# Patient Record
Sex: FEMALE | Race: WHITE | NOT HISPANIC OR LATINO | Employment: OTHER | URBAN - METROPOLITAN AREA
[De-identification: names, ages, dates, MRNs, and addresses within clinical notes are randomized per-mention and may not be internally consistent; named-entity substitution may affect disease eponyms.]

---

## 2022-08-18 PROBLEM — F06.2 PSYCHOTIC DISORDER DUE TO ANOTHER MEDICAL CONDITION WITH DELUSIONS: Status: ACTIVE | Noted: 2022-08-18

## 2022-08-18 PROBLEM — S06.9XAA TBI (TRAUMATIC BRAIN INJURY): Status: ACTIVE | Noted: 2022-08-18

## 2022-08-18 PROBLEM — S06.9XAA TBI (TRAUMATIC BRAIN INJURY): Status: RESOLVED | Noted: 2022-08-18 | Resolved: 2022-08-18

## 2022-08-31 ENCOUNTER — PATIENT OUTREACH (OUTPATIENT)
Dept: ADMINISTRATIVE | Facility: CLINIC | Age: 62
End: 2022-08-31
Payer: MEDICARE

## 2022-08-31 NOTE — PROGRESS NOTES
C3 nurse attempted to contact Criss Seth  for a TCC post hospital discharge follow up call. No answer. No voicemail available.The patient has an appointment with the ACT team for a home visit on 9/1/22 @ [phone].         Admitted

## 2022-09-01 NOTE — TELEPHONE ENCOUNTER
2nd attempt made to reach patient for TCC call. No answer on no voicemail, x both numbers listed.

## 2022-09-02 NOTE — TELEPHONE ENCOUNTER
3rd attempt made to reach patient for TCC call. No answer and no voicemail x both numbers listed.

## 2023-04-05 DIAGNOSIS — I10 ESSENTIAL HYPERTENSION: Primary | ICD-10-CM

## 2023-04-05 RX ORDER — PROPRANOLOL HYDROCHLORIDE 60 MG/1
60 CAPSULE, EXTENDED RELEASE ORAL DAILY
Qty: 30 CAPSULE | Refills: 1 | Status: SHIPPED | OUTPATIENT
Start: 2023-04-05 | End: 2023-06-05 | Stop reason: SDUPTHER

## 2023-04-05 NOTE — TELEPHONE ENCOUNTER
----- Message from Akua Quintanilla sent at 4/5/2023 10:41 AM CDT -----  Contact: [phone] Patient  Pt is asking if Dr Contreras can please fill her bp meds until she sees Dr Contreras on 05/02/2023? Pt stated strokes and heart issues run in her family. Pt is out of her medication    Requesting an RX refill or new RX.  Is this a refill or new RX: new  RX name and strength (copy/paste from chart):  propranoloL (INDERAL LA) 60 MG 24 hr capsule  Is this a 30 day or 90 day RX:   Pharmacy name and phone # (copy/paste from chart):    [facility] - [place] - [id number]  [address]  [place] [address]  Phone: [phone] Fax: [phone]

## 2023-05-18 ENCOUNTER — TELEPHONE (OUTPATIENT)
Dept: PRIMARY CARE CLINIC | Facility: CLINIC | Age: 63
End: 2023-05-18
Payer: MEDICARE

## 2023-05-18 NOTE — TELEPHONE ENCOUNTER
----- Message from Becky Rogers sent at 5/18/2023  3:11 PM CDT -----  Regarding: pt called  Name of Who is Calling:  Pt called        What is the request in detail:  The pt needs to be rescheduled she was sick the day of her original appt and will be out of medication before the next available appt 06/19 . Please advise         Can the clinic reply by [facility]: no          What [facility] to [facility] Back [facility] [phone] (home)

## 2023-06-05 DIAGNOSIS — I10 ESSENTIAL HYPERTENSION: ICD-10-CM

## 2023-06-05 RX ORDER — PROPRANOLOL HYDROCHLORIDE 60 MG/1
60 CAPSULE, EXTENDED RELEASE ORAL DAILY
Qty: 30 CAPSULE | Refills: 1 | Status: SHIPPED | OUTPATIENT
Start: 2023-06-05 | End: 2023-07-24
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
## 2023-06-20 ENCOUNTER — OFFICE VISIT (OUTPATIENT)
Dept: PRIMARY CARE CLINIC | Facility: CLINIC | Age: 63
End: 2023-06-20
Payer: MEDICARE

## 2023-06-20 VITALS
HEIGHT: 64 IN | OXYGEN SATURATION: 98 % | TEMPERATURE: 98 F | HEART RATE: 82 BPM | WEIGHT: 124.75 LBS | RESPIRATION RATE: 18 BRPM | BODY MASS INDEX: 21.3 KG/M2 | SYSTOLIC BLOOD PRESSURE: 128 MMHG | DIASTOLIC BLOOD PRESSURE: 82 MMHG

## 2023-06-20 DIAGNOSIS — M81.0 OSTEOPOROSIS, UNSPECIFIED OSTEOPOROSIS TYPE, UNSPECIFIED PATHOLOGICAL FRACTURE PRESENCE: ICD-10-CM

## 2023-06-20 DIAGNOSIS — M54.41 CHRONIC LOW BACK PAIN WITH BILATERAL SCIATICA, UNSPECIFIED BACK PAIN LATERALITY: ICD-10-CM

## 2023-06-20 DIAGNOSIS — M54.42 CHRONIC LOW BACK PAIN WITH BILATERAL SCIATICA, UNSPECIFIED BACK PAIN LATERALITY: ICD-10-CM

## 2023-06-20 DIAGNOSIS — R73.01 IFG (IMPAIRED FASTING GLUCOSE): ICD-10-CM

## 2023-06-20 DIAGNOSIS — G89.29 CHRONIC LOW BACK PAIN WITH BILATERAL SCIATICA, UNSPECIFIED BACK PAIN LATERALITY: ICD-10-CM

## 2023-06-20 DIAGNOSIS — Z12.31 ENCOUNTER FOR SCREENING MAMMOGRAM FOR MALIGNANT NEOPLASM OF BREAST: ICD-10-CM

## 2023-06-20 DIAGNOSIS — F33.9 EPISODE OF RECURRENT MAJOR DEPRESSIVE DISORDER, UNSPECIFIED DEPRESSION EPISODE SEVERITY: ICD-10-CM

## 2023-06-20 DIAGNOSIS — G40.909 SEIZURE DISORDER: ICD-10-CM

## 2023-06-20 DIAGNOSIS — I10 ESSENTIAL HYPERTENSION: Primary | ICD-10-CM

## 2023-06-20 PROCEDURE — 3288F FALL RISK ASSESSMENT DOCD: CPT | Mod: CPTII,S$GLB,, | Performed by: INTERNAL MEDICINE

## 2023-06-20 PROCEDURE — 1101F PT FALLS ASSESS-DOCD LE1/YR: CPT | Mod: CPTII,S$GLB,, | Performed by: INTERNAL MEDICINE

## 2023-06-20 PROCEDURE — 99999 PR PBB SHADOW E&M-EST. PATIENT-LVL V: CPT | Mod: PBBFAC,,, | Performed by: INTERNAL MEDICINE

## 2023-06-20 PROCEDURE — 3079F PR MOST RECENT DIASTOLIC BLOOD PRESSURE 80-89 MM HG: ICD-10-PCS | Mod: CPTII,S$GLB,, | Performed by: INTERNAL MEDICINE

## 2023-06-20 PROCEDURE — 1159F MED LIST DOCD IN RCRD: CPT | Mod: CPTII,S$GLB,, | Performed by: INTERNAL MEDICINE

## 2023-06-20 PROCEDURE — 3074F PR MOST RECENT SYSTOLIC BLOOD PRESSURE < 130 MM HG: ICD-10-PCS | Mod: CPTII,S$GLB,, | Performed by: INTERNAL MEDICINE

## 2023-06-20 PROCEDURE — 3008F PR BODY MASS INDEX (BMI) DOCUMENTED: ICD-10-PCS | Mod: CPTII,S$GLB,, | Performed by: INTERNAL MEDICINE

## 2023-06-20 PROCEDURE — 3079F DIAST BP 80-89 MM HG: CPT | Mod: CPTII,S$GLB,, | Performed by: INTERNAL MEDICINE

## 2023-06-20 PROCEDURE — 1101F PR PT FALLS ASSESS DOC 0-1 FALLS W/OUT INJ PAST YR: ICD-10-PCS | Mod: CPTII,S$GLB,, | Performed by: INTERNAL MEDICINE

## 2023-06-20 PROCEDURE — 99204 OFFICE O/P NEW MOD 45 MIN: CPT | Mod: S$GLB,,, | Performed by: INTERNAL MEDICINE

## 2023-06-20 PROCEDURE — 99204 PR OFFICE/OUTPT VISIT, NEW, LEVL IV, 45-59 MIN: ICD-10-PCS | Mod: S$GLB,,, | Performed by: INTERNAL MEDICINE

## 2023-06-20 PROCEDURE — 3288F PR FALLS RISK ASSESSMENT DOCUMENTED: ICD-10-PCS | Mod: CPTII,S$GLB,, | Performed by: INTERNAL MEDICINE

## 2023-06-20 PROCEDURE — 3008F BODY MASS INDEX DOCD: CPT | Mod: CPTII,S$GLB,, | Performed by: INTERNAL MEDICINE

## 2023-06-20 PROCEDURE — 3074F SYST BP LT 130 MM HG: CPT | Mod: CPTII,S$GLB,, | Performed by: INTERNAL MEDICINE

## 2023-06-20 PROCEDURE — 1159F PR MEDICATION LIST DOCUMENTED IN MEDICAL RECORD: ICD-10-PCS | Mod: CPTII,S$GLB,, | Performed by: INTERNAL MEDICINE

## 2023-06-20 PROCEDURE — 99999 PR PBB SHADOW E&M-EST. PATIENT-LVL V: ICD-10-PCS | Mod: PBBFAC,,, | Performed by: INTERNAL MEDICINE

## 2023-06-20 RX ORDER — TRAZODONE HYDROCHLORIDE 50 MG/1
50 TABLET ORAL NIGHTLY
Qty: 30 TABLET | Refills: 5 | Status: SHIPPED | OUTPATIENT
Start: 2023-06-20 | End: 2023-12-05

## 2023-06-20 RX ORDER — PROMETHAZINE HYDROCHLORIDE 25 MG/1
25 TABLET ORAL DAILY
COMMUNITY
Start: 2023-05-22

## 2023-07-10 ENCOUNTER — TELEPHONE (OUTPATIENT)
Dept: PRIMARY CARE CLINIC | Facility: CLINIC | Age: 63
End: 2023-07-10
Payer: MEDICARE

## 2023-07-10 NOTE — TELEPHONE ENCOUNTER
Pt says the trazodone is not working and she is having panic attacks really bad lately, wants to try another medication for sleeping and wants something that will help will the panic attacks.

## 2023-07-10 NOTE — TELEPHONE ENCOUNTER
----- Message from Ivette Archer sent at 7/10/2023  9:45 AM CDT -----  Contact: self [phone]  Pt requesting a call in regards to sleep meds.    Please call and advise

## 2023-07-11 ENCOUNTER — TELEPHONE (OUTPATIENT)
Dept: PRIMARY CARE CLINIC | Facility: CLINIC | Age: 63
End: 2023-07-11
Payer: MEDICARE

## 2023-07-12 RX ORDER — HYDROXYZINE HYDROCHLORIDE 50 MG/1
50 TABLET, FILM COATED ORAL NIGHTLY PRN
Qty: 30 TABLET | Refills: 2 | Status: SHIPPED | OUTPATIENT
Start: 2023-07-12

## 2023-07-12 NOTE — TELEPHONE ENCOUNTER
WESLEY Broderick I sent hydroxyzine to the pharmacy.  This is a relatively safe, not habit forming medicaiton that can help with sleep and help with anxiety.  It would be safe to take with the pain medication she is being prescribed.

## 2023-07-13 ENCOUNTER — TELEPHONE (OUTPATIENT)
Dept: PRIMARY CARE CLINIC | Facility: CLINIC | Age: 63
End: 2023-07-13
Payer: MEDICARE

## 2023-07-14 NOTE — TELEPHONE ENCOUNTER
I did send in hydroyxine back on 7/11 for the sleep:    This is a relatively safe, not habit forming medicaiton that can help with sleep and help with anxiety.  It would be safe to take with the pain medication she is being prescribed.    Had she already tried that or did she not know it had been called in?

## 2023-07-14 NOTE — TELEPHONE ENCOUNTER
Spoke with pt  States that she does not take any pain medication  She has tried the medication that was prescribed but its not working.

## 2023-07-17 ENCOUNTER — PATIENT MESSAGE (OUTPATIENT)
Dept: PRIMARY CARE CLINIC | Facility: CLINIC | Age: 63
End: 2023-07-17
Payer: MEDICARE

## 2023-07-19 ENCOUNTER — PATIENT MESSAGE (OUTPATIENT)
Dept: PRIMARY CARE CLINIC | Facility: CLINIC | Age: 63
End: 2023-07-19
Payer: MEDICARE
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
## 2023-07-19 NOTE — PROGRESS NOTES
[facility] Progress Note  9/21/2023    This was a virtual visit conducted via webcam.      Reason for Visit:      is having trouble with insomnia  Time spent on visit today: 15 minutes    History of Present Illness:     Insomnia  In early 20s was on ambien for sleep she says- worked well, but had some sleepwalking episodes.  Recently has been having trouble with insomnia again.  We tried trazodone initially - 1 (50 mg) wasn't working, then tried 2 (100 mg)- it wasn't helping.  Then tried hydroxyzine (50 mg)- she felt calm but couldn't sleep all night - felt like it made the insomnia worse.  She wants to try something else.  We had been reluctant to use sedative hypnotics because PDMP showed opiate rx, but she explains those were for dental work and were temporary - she is no longer on them - pdmp shows last fill on 6/15 which is consistent with her explanation.    We discussed possible side effects of the sedative hypnotics, including dependence.  Encouraged to use as sparingly as possible.    Also, patient is scheduled today for a CT scan at [facility].  She says this was initially ordered by her orthopedist, Dr. Otis Godoy, Fax [phone].  She is unsure what it is a CT scan of.  She says that it was related to calcifications in her arteries.  I can only see diagnoses from her visits which mention Abdominal aortic aneurysm, so i'm wondering if that was possibly seen on imaging of her back and his is follow up?  WE are going to request the reports/records to clarify.      Medications:       Current Outpatient Medications:     cyanocobalamin 500 mcg tablet, Take 2 tablets (1,000 mcg total) by mouth once daily., Disp: 60 tablet, Rfl: 1    diphenoxylate-atropine 2.5-0.025 mg (LOMOTIL) 2.5-0.025 mg per tablet, Take 1 tablet by mouth 4 (four) times daily as needed for Diarrhea., Disp: , Rfl:     hydrOXYzine (ATARAX) 50 MG tablet, Take 1 tablet (50 mg total) by mouth nightly as needed for Anxiety  (for sleep/anxiety)., Disp: 30 tablet, Rfl: 2    lamoTRIgine (LAMICTAL) 25 MG tablet, Take 2 tablets (50 mg total) by mouth once daily., Disp: 30 tablet, Rfl: 1    promethazine (PHENERGAN) 25 MG tablet, Take 25 mg by mouth once daily., Disp: , Rfl:     traZODone (DESYREL) 50 MG tablet, Take 1 tablet (50 mg total) by mouth every evening., Disp: 30 tablet, Rfl: 5    propranoloL (INDERAL LA) 60 MG 24 hr capsule, TAKE ONE CAPSULE BY MOUTH EVERY DAY, Disp: 30 capsule, Rfl: 0    zolpidem (AMBIEN) 5 MG Tab, Take 1 tablet (5 mg total) by mouth nightly as needed., Disp: 30 tablet, Rfl: 2      Review of Systems:     Review of Systems   Constitutional:  Negative for activity change and unexpected weight change.   HENT:  Positive for rhinorrhea. Negative for hearing loss and trouble swallowing.    Eyes:  Negative for discharge and visual disturbance.   Respiratory:  Negative for chest tightness and wheezing.    Cardiovascular:  Negative for chest pain and palpitations.   Gastrointestinal:  Negative for blood in stool, constipation, diarrhea and vomiting.   Endocrine: Negative for polydipsia and polyuria.   Genitourinary:  Negative for difficulty urinating, dysuria, hematuria and menstrual problem.   Musculoskeletal:  Negative for arthralgias, joint swelling and neck pain.   Neurological:  Negative for weakness and headaches.   Psychiatric/Behavioral:  Positive for dysphoric mood. Negative for confusion.            Physical Exam:     Pt is alert and oriented.  Speech is clear and coherent.  Speaking in complete sentences.  No distress.  Mood and affect are normal.      Assessment and Plan:     1. Insomnia, unspecified type  Will try zolpidem 5 mg po qhs prn sleep  Discussed potential risks/dependence.  Follow up if no improvement or intolerable side effects.  Use as sparingly as possible.    Will request records from Dr. Otis Godoy to try to clarify what kind of problem she has with arteries.       Kailash Contreras,  MD  9/21/2023    This note was prepared using voice-recognition software.  Although efforts are made to proofread the note, some errors may persist in the final document.    Dr. Contreras's [place] License number is [id number]  This was a virtual (audio/video visit) in lieu of in-person visit in context of the coronavirus emergency.      Patient/Family members identity was confirmed, and confidentiality/privacy confirmed prior to visit. Verbal informed consent was obtained from the patient's legal guardian or patient when appropriate to conduct this virtual visit.  They authorized me to provide medical care and voiced understanding of the risks, benefits, and alternatives of virtual care.  Guardian understands the limitations inherent of a virtual visit, that they may choose to be seen in person if desired or needed, and that they may halt the virtual visit at any time for any reason.     Originating Site: Patient's home   Distant Site:  [facility]     I certify that this visit was done via secure two-way simultaneous audio and video transmission with informed consent of the patient and/or guardian. Over 50% of the time was counseling or coordinating care.

## 2023-07-20 ENCOUNTER — OFFICE VISIT (OUTPATIENT)
Dept: PRIMARY CARE CLINIC | Facility: CLINIC | Age: 63
End: 2023-07-20
Payer: MEDICARE

## 2023-07-20 VITALS — BODY MASS INDEX: 21.28 KG/M2 | WEIGHT: 124 LBS

## 2023-07-20 DIAGNOSIS — G47.00 INSOMNIA, UNSPECIFIED TYPE: Primary | ICD-10-CM

## 2023-07-20 PROCEDURE — 99214 OFFICE O/P EST MOD 30 MIN: CPT | Mod: 95,,, | Performed by: INTERNAL MEDICINE

## 2023-07-20 PROCEDURE — 99214 PR OFFICE/OUTPT VISIT, EST, LEVL IV, 30-39 MIN: ICD-10-PCS | Mod: 95,,, | Performed by: INTERNAL MEDICINE

## 2023-07-20 PROCEDURE — 3288F FALL RISK ASSESSMENT DOCD: CPT | Mod: CPTII,95,, | Performed by: INTERNAL MEDICINE

## 2023-07-20 PROCEDURE — 1101F PR PT FALLS ASSESS DOC 0-1 FALLS W/OUT INJ PAST YR: ICD-10-PCS | Mod: CPTII,95,, | Performed by: INTERNAL MEDICINE

## 2023-07-20 PROCEDURE — 1101F PT FALLS ASSESS-DOCD LE1/YR: CPT | Mod: CPTII,95,, | Performed by: INTERNAL MEDICINE

## 2023-07-20 PROCEDURE — 3008F PR BODY MASS INDEX (BMI) DOCUMENTED: ICD-10-PCS | Mod: CPTII,95,, | Performed by: INTERNAL MEDICINE

## 2023-07-20 PROCEDURE — 1159F MED LIST DOCD IN RCRD: CPT | Mod: CPTII,95,, | Performed by: INTERNAL MEDICINE

## 2023-07-20 PROCEDURE — 1159F PR MEDICATION LIST DOCUMENTED IN MEDICAL RECORD: ICD-10-PCS | Mod: CPTII,95,, | Performed by: INTERNAL MEDICINE

## 2023-07-20 PROCEDURE — 3008F BODY MASS INDEX DOCD: CPT | Mod: CPTII,95,, | Performed by: INTERNAL MEDICINE

## 2023-07-20 PROCEDURE — 3288F PR FALLS RISK ASSESSMENT DOCUMENTED: ICD-10-PCS | Mod: CPTII,95,, | Performed by: INTERNAL MEDICINE

## 2023-07-20 RX ORDER — ZOLPIDEM TARTRATE 5 MG/1
5 TABLET ORAL NIGHTLY PRN
Qty: 30 TABLET | Refills: 2 | Status: SHIPPED | OUTPATIENT
Start: 2023-07-20 | End: 2023-11-24

## 2023-09-08 ENCOUNTER — TELEPHONE (OUTPATIENT)
Dept: PRIMARY CARE CLINIC | Facility: CLINIC | Age: 63
End: 2023-09-08
Payer: MEDICARE

## 2023-09-08 NOTE — TELEPHONE ENCOUNTER
----- Message from Candie Hutrado MA sent at 9/8/2023  2:25 PM CDT -----  Regarding: call back  Contact: self [phone]    ----- Message -----  From: Ivette Archer  Sent: 9/8/2023   2:19 PM CDT  To: Diane Linder Staff    Patient is returning a phone call.  Who left a message for the patient: Lisa Kinney MA   Does patient know what this is regarding:    Would you like a call back, or a response through your [facility] portal?:   call back  Comments:     Please call and advise

## 2023-09-08 NOTE — TELEPHONE ENCOUNTER
----- Message from Gustavo Silva sent at 9/8/2023  8:49 AM CDT -----  Contact: [phone]  1MEDICALADVICE     Patient is calling for Medical Advice regarding: about med zolpidem (AMBIEN) 5 MG Tab    How long has patient had these symptoms:    Pharmacy name and phone#:    Would like response via [facility]:  call back     Comments:    Pt said she needs a call back about her zolpidem (AMBIEN) 5 MG Tab is not working.

## 2023-09-14 ENCOUNTER — PATIENT MESSAGE (OUTPATIENT)
Dept: PRIMARY CARE CLINIC | Facility: CLINIC | Age: 63
End: 2023-09-14
Payer: MEDICARE
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
## 2023-09-21 ENCOUNTER — OFFICE VISIT (OUTPATIENT)
Dept: PRIMARY CARE CLINIC | Facility: CLINIC | Age: 63
End: 2023-09-21
Payer: MEDICARE

## 2023-09-21 DIAGNOSIS — I10 ESSENTIAL HYPERTENSION: ICD-10-CM

## 2023-09-21 DIAGNOSIS — G47.00 INSOMNIA, UNSPECIFIED TYPE: Primary | ICD-10-CM

## 2023-09-21 PROBLEM — F06.2 PSYCHOTIC DISORDER DUE TO ANOTHER MEDICAL CONDITION WITH DELUSIONS: Status: RESOLVED | Noted: 2022-08-18 | Resolved: 2023-09-21

## 2023-09-21 PROBLEM — S06.9XAA TBI (TRAUMATIC BRAIN INJURY): Status: RESOLVED | Noted: 2022-08-18 | Resolved: 2023-09-21

## 2023-09-21 PROCEDURE — 99213 OFFICE O/P EST LOW 20 MIN: CPT | Mod: 95,,, | Performed by: INTERNAL MEDICINE

## 2023-09-21 PROCEDURE — 99213 PR OFFICE/OUTPT VISIT, EST, LEVL III, 20-29 MIN: ICD-10-PCS | Mod: 95,,, | Performed by: INTERNAL MEDICINE

## 2023-11-24 RX ORDER — ZOLPIDEM TARTRATE 5 MG/1
TABLET ORAL
Qty: 30 TABLET | Refills: 2 | Status: SHIPPED | OUTPATIENT
Start: 2023-11-24 | End: 2024-03-15

## 2023-11-24 NOTE — TELEPHONE ENCOUNTER
Refill Routing Note   Medication(s) are not appropriate for processing by [facility] for the following reason(s):        Outside of protocol  Responsible provider unclear    [facility] action(s):  Route               Appointments  past 12m or future 3m with PCP    Date Provider   Last Visit   9/21/2023 Kailash Contreras MD   Next Visit   Visit date not found Kailash Contreras MD   ED visits in past 90 days: 0        Note composed:11:43 AM 11/24/2023

## 2023-12-05 RX ORDER — TRAZODONE HYDROCHLORIDE 50 MG/1
50 TABLET ORAL NIGHTLY
Qty: 30 TABLET | Refills: 5 | Status: SHIPPED | OUTPATIENT
Start: 2023-12-05

## 2023-12-05 NOTE — TELEPHONE ENCOUNTER
Refill Routing Note   Medication(s) are not appropriate for processing by [facility] for the following reason(s):        Responsible provider unclear    [facility] action(s):  Defer               Appointments  past 12m or future 3m with PCP    Date Provider   Last Visit   9/21/2023 Kailash Contreras MD   Next Visit   Visit date not found Kailash Contreras MD   ED visits in past 90 days: 0        Note composed:10:00 PM 12/04/2023

## 2024-02-29 ENCOUNTER — TELEPHONE (OUTPATIENT)
Dept: PRIMARY CARE CLINIC | Facility: CLINIC | Age: 64
End: 2024-02-29
Payer: COMMERCIAL

## 2024-03-15 DIAGNOSIS — G47.00 INSOMNIA, UNSPECIFIED TYPE: Primary | ICD-10-CM

## 2024-03-15 RX ORDER — ZOLPIDEM TARTRATE 5 MG/1
5 TABLET ORAL NIGHTLY PRN
Qty: 30 TABLET | Refills: 2 | Status: SHIPPED | OUTPATIENT
Start: 2024-03-15

## 2024-03-15 NOTE — TELEPHONE ENCOUNTER
No care due was identified.  [facility] Embedded Care Due Messages. Reference number: [id number].   3/15/2024 1:47:13 PM CDT

## 2024-04-10 ENCOUNTER — PATIENT OUTREACH (OUTPATIENT)
Dept: ADMINISTRATIVE | Facility: HOSPITAL | Age: 64
End: 2024-04-10
Payer: COMMERCIAL

## 2024-05-10 ENCOUNTER — PATIENT OUTREACH (OUTPATIENT)
Dept: ADMINISTRATIVE | Facility: HOSPITAL | Age: 64
End: 2024-05-10
Payer: COMMERCIAL

## 2024-06-19 DIAGNOSIS — G47.00 INSOMNIA, UNSPECIFIED TYPE: ICD-10-CM

## 2024-06-19 NOTE — TELEPHONE ENCOUNTER
No care due was identified.  [facility] Embedded Care Due Messages. Reference number: [id number].   6/19/2024 5:22:17 PM CDT

## 2024-06-20 RX ORDER — TRAZODONE HYDROCHLORIDE 50 MG/1
50 TABLET ORAL NIGHTLY
Qty: 30 TABLET | Refills: 0 | Status: SHIPPED | OUTPATIENT
Start: 2024-06-20

## 2024-06-20 RX ORDER — ZOLPIDEM TARTRATE 5 MG/1
TABLET ORAL
Qty: 30 TABLET | Refills: 2 | Status: SHIPPED | OUTPATIENT
Start: 2024-06-20

## 2024-06-20 NOTE — TELEPHONE ENCOUNTER
Refill Routing Note   Medication(s) are not appropriate for processing by [facility] for the following reason(s):        Outside of protocol    [facility] action(s):  Route  Approve        Medication Therapy Plan: AMBIEN-OP; LOV(9/21/23)      Appointments  past 12m or future 3m with PCP    Date Provider   Last Visit   9/21/2023 Kailash Contreras MD   Next Visit   Visit date not found Kailash Contreras MD   ED visits in past 90 days: 0        Note composed:7:25 AM 06/20/2024

## 2024-07-11 ENCOUNTER — PATIENT OUTREACH (OUTPATIENT)
Dept: ADMINISTRATIVE | Facility: HOSPITAL | Age: 64
End: 2024-07-11
Payer: COMMERCIAL

## 2024-07-17 ENCOUNTER — PATIENT OUTREACH (OUTPATIENT)
Dept: ADMINISTRATIVE | Facility: HOSPITAL | Age: 64
End: 2024-07-17
Payer: COMMERCIAL

## 2024-07-17 RX ORDER — TRAZODONE HYDROCHLORIDE 50 MG/1
50 TABLET ORAL NIGHTLY
Qty: 90 TABLET | Refills: 0 | Status: SHIPPED | OUTPATIENT
Start: 2024-07-17

## 2024-07-17 NOTE — TELEPHONE ENCOUNTER
No care due was identified.  [facility] Embedded Care Due Messages. Reference number: [id number].   7/17/2024 6:49:19 PM CDT

## 2024-07-18 NOTE — TELEPHONE ENCOUNTER
Refill Decision Note   Criss Seth  is requesting a refill authorization.  Brief Assessment and Rationale for Refill:  Approve     Medication Therapy Plan:  LOV 9/21/23      Comments:     Note composed:7:37 PM 07/17/2024

## 2024-08-06 DIAGNOSIS — I10 ESSENTIAL HYPERTENSION: ICD-10-CM

## 2024-08-06 RX ORDER — PROPRANOLOL HYDROCHLORIDE 60 MG/1
60 CAPSULE, EXTENDED RELEASE ORAL DAILY
Qty: 30 CAPSULE | Refills: 0 | Status: SHIPPED | OUTPATIENT
Start: 2024-08-06

## 2024-08-09 ENCOUNTER — PATIENT OUTREACH (OUTPATIENT)
Dept: ADMINISTRATIVE | Facility: HOSPITAL | Age: 64
End: 2024-08-09
Payer: COMMERCIAL

## 2024-08-30 DIAGNOSIS — I10 ESSENTIAL HYPERTENSION: ICD-10-CM

## 2024-08-30 RX ORDER — PROPRANOLOL HYDROCHLORIDE 60 MG/1
60 CAPSULE, EXTENDED RELEASE ORAL
Qty: 30 CAPSULE | Refills: 2 | Status: SHIPPED | OUTPATIENT
Start: 2024-08-30

## 2024-08-30 NOTE — TELEPHONE ENCOUNTER
Refill Routing Note   Medication(s) are not appropriate for processing by [facility] for the following reason(s):        Required vitals outdated    [facility] action(s):  Defer               Appointments  past 12m or future 3m with PCP    Date Provider   Last Visit   9/21/2023 Kailash Contreras MD   Next Visit   Visit date not found Kailash Contreras MD   ED visits in past 90 days: 0        Note composed:1:50 PM 08/30/2024

## 2024-08-30 NOTE — TELEPHONE ENCOUNTER
No care due was identified.  Health system Embedded Care Due Messages. Reference number: [id number].   8/30/2024 6:29:10 AM CDT

## 2024-10-01 DIAGNOSIS — G47.00 INSOMNIA, UNSPECIFIED TYPE: ICD-10-CM

## 2024-10-01 RX ORDER — ZOLPIDEM TARTRATE 5 MG/1
5 TABLET ORAL NIGHTLY
Qty: 30 TABLET | Refills: 2 | Status: SHIPPED | OUTPATIENT
Start: 2024-10-01

## 2024-10-01 NOTE — TELEPHONE ENCOUNTER
No care due was identified.  [facility] Embedded Care Due Messages. Reference number: [id number].   10/01/2024 11:21:24 AM CDT

## 2024-10-01 NOTE — TELEPHONE ENCOUNTER
----- Message from Cris sent at 10/1/2024 11:01 AM CDT -----  Contact: Patient [phone]  Requesting an RX refill or new RX.    Is this a refill or new RX:     RX name and strength zolpidem (AMBIEN) 5 MG Tab  Is this a 30 day or 90 day RX: 90    Pharmacy name and phone #   [facility] - [place] - [facility] [address]  [facility] [address]  [place] [address]  Phone: [phone] Fax: [phone]        The doctors have asked that we provide their patients with the following 2 reminders -- prescription refills can take up to 72 hours, and a friendly reminder that in the future you can use your [facility] account to request refills: YES

## 2024-10-14 RX ORDER — TRAZODONE HYDROCHLORIDE 50 MG/1
50 TABLET ORAL NIGHTLY
Qty: 90 TABLET | Refills: 0 | Status: SHIPPED | OUTPATIENT
Start: 2024-10-14

## 2024-10-14 NOTE — TELEPHONE ENCOUNTER
No care due was identified.  [facility] Embedded Care Due Messages. Reference number: [id number].   10/14/2024 6:02:22 AM CDT

## 2024-10-14 NOTE — TELEPHONE ENCOUNTER
Refill Decision Note   Criss Seth  is requesting a refill authorization.  Brief Assessment and Rationale for Refill:  Approve     Medication Therapy Plan:         Comments:     Note composed:11:54 AM 10/14/2024

## 2024-11-29 DIAGNOSIS — I10 ESSENTIAL HYPERTENSION: ICD-10-CM

## 2024-11-29 NOTE — TELEPHONE ENCOUNTER
Care Due:                  Date            Visit Type   Department     Provider  --------------------------------------------------------------------------------                                ESTABLISHED                              PATIENT -    [facility]  Last Visit: 09-      [facility]           Kailash Contreras  Next Visit: None Scheduled  None         None Found                                                            Last  Test          Frequency    Reason                     Performed    Due Date  --------------------------------------------------------------------------------    Office Visit  15 months..  propranoloL, traZODone...  09- 12-    [facility] Embedded Care Due Messages. Reference number: [id number].   11/29/2024 4:53:01 PM CST

## 2024-11-30 NOTE — TELEPHONE ENCOUNTER
Refill Routing Note   Medication(s) are not appropriate for processing by [facility] for the following reason(s):        Required vitals outdated    [facility] action(s):  Defer   Requires appointment : Yes             Appointments  past 12m or future 3m with PCP    Date Provider   Last Visit   9/21/2023 Kailash Contreras MD   Next Visit   Visit date not found Kailash Contreras MD   ED visits in past 90 days: 0        Note composed:11:04 PM 11/29/2024

## 2024-12-01 RX ORDER — PROPRANOLOL HYDROCHLORIDE 60 MG/1
60 CAPSULE, EXTENDED RELEASE ORAL
Qty: 90 CAPSULE | Refills: 0 | Status: SHIPPED | OUTPATIENT
Start: 2024-12-01

## 2024-12-30 DIAGNOSIS — G47.00 INSOMNIA, UNSPECIFIED TYPE: ICD-10-CM

## 2024-12-30 NOTE — TELEPHONE ENCOUNTER
No care due was identified.  [facility] Embedded Care Due Messages. Reference number: [id number].   12/30/2024 9:22:46 AM CST

## 2024-12-31 DIAGNOSIS — I10 ESSENTIAL HYPERTENSION: ICD-10-CM

## 2025-01-01 RX ORDER — ZOLPIDEM TARTRATE 5 MG/1
5 TABLET ORAL NIGHTLY
Qty: 30 TABLET | Refills: 0 | Status: SHIPPED | OUTPATIENT
Start: 2025-01-01

## 2025-02-06 DIAGNOSIS — G47.00 INSOMNIA, UNSPECIFIED TYPE: ICD-10-CM

## 2025-02-06 RX ORDER — ZOLPIDEM TARTRATE 5 MG/1
5 TABLET ORAL NIGHTLY
Qty: 30 TABLET | Refills: 0 | Status: SHIPPED | OUTPATIENT
Start: 2025-02-06 | End: 2025-02-19

## 2025-02-06 RX ORDER — TRAZODONE HYDROCHLORIDE 50 MG/1
50 TABLET ORAL NIGHTLY
Qty: 90 TABLET | Refills: 0 | Status: SHIPPED | OUTPATIENT
Start: 2025-02-06

## 2025-02-06 NOTE — TELEPHONE ENCOUNTER
No care due was identified.  [facility] Embedded Care Due Messages. Reference number: [id number].   2/06/2025 4:32:08 PM CST

## 2025-02-16 PROBLEM — F11.20 UNCOMPLICATED OPIOID DEPENDENCE: Status: ACTIVE | Noted: 2025-02-16

## 2025-02-16 PROBLEM — M48.8X6 OTHER SPECIFIED SPONDYLOPATHIES, LUMBAR REGION: Status: ACTIVE | Noted: 2025-02-16

## 2025-02-16 PROBLEM — F29 PSYCHOSIS, UNSPECIFIED PSYCHOSIS TYPE: Status: ACTIVE | Noted: 2025-02-16

## 2025-02-16 NOTE — PROGRESS NOTES
[facility] Progress Note  2/19/2025          Reason for Visit:      had concerns including Annual Exam and Nasal Congestion.       History of Present Illness:     Patient presents today with upper respiratory symptoms      HISTORY OF PRESENT ILLNESS:  She reports a 3-week history of chronic diarrhea with vomiting that occurred last weekend and recurred yesterday after lunch. She had a low-grade fever of 98.3°F this morning, currently 97.7°F. She describes a persistent non-productive cough throughout the day with sinus congestion that worsens in the evening and morning hours. She reports significant fatigue, noting exhaustion after minimal activity. She denies ear pain.    HTN  Propranolol LA 60 mg  Pt reports she was started on this for Hypertension.    She says it has been working well and denies any side effects    Seizure disorder  Lamotrigine 100 daily  2 seizures in 1 day about 27 years ago, have been controlled since then  No recent issues  Follows with neurology   (Previously was on dilantin - gum hypertrophy, then depakote- off those now)       Insomnia  We tried trazodone initially - 1 (50 mg) wasn't working.  We advised she could try taking 2 but hasn't tried that yet  Then tried hydroxyzine (50 mg)- she felt calm but couldn't sleep all night - felt like it made the insomnia worse.  Since last visit, has been using ambien 5 mg - it helps her fall asleep but staying asleep is still difficult.  She says that alprazolam has worked better previously, and would like to use that sparingly in place of these meds.    SOCIAL HISTORY:  She is a current smoker who reports actively trying to quit, acknowledging difficulty with maintaining cessation. She reports a poor diet.    MENTAL HEALTH:  She reports feeling bored and confined due to recent illness and being homebound. She expresses frustration with the combination of snow and illness limiting her activities, but denies depression, citing  "excitement about future plans.         Problem List:     Problem List[1]      Surgical History:     She has a past surgical history that includes Craniotomy.      Family History:      Her family history is not on file.      Social History:     She reports that she has been smoking cigarettes. She has never used smokeless tobacco.      Medications:     Current Medications[2]        Allergies:   She is allergic to aspirin, other omega-3s, sulfa (sulfonamide antibiotics), bactrim [sulfamethoxazole-trimethoprim], and nsaids (non-steroidal anti-inflammatory drug).      Review of Systems:     Review of Systems   Constitutional:  Negative for chills and fever.   HENT:  Negative for ear pain and sore throat.    Respiratory:  Positive for cough. Negative for shortness of breath and wheezing.    Cardiovascular:  Negative for chest pain and palpitations.   Gastrointestinal:  Negative for constipation, diarrhea, nausea and vomiting.   Genitourinary:  Negative for dysuria and hematuria.   Musculoskeletal:  Negative for joint swelling and joint deformity.   Neurological:  Negative for dizziness and weakness.           Physical Exam:     Temp:             Blood Pressure:  136/70        Pulse:   (!) 143     Respirations:     Weight:   58.5 kg (129 lb)  Height:   5' 4" (1.626 m)  BMI:     Body mass index is 22.14 kg/m².    Physical Exam  Constitutional:       General: She is not in acute distress.  HENT:      Right Ear: Tympanic membrane normal.      Left Ear: Tympanic membrane normal.      Nose: No congestion or rhinorrhea.      Mouth/Throat:      Pharynx: No oropharyngeal exudate or posterior oropharyngeal erythema.   Cardiovascular:      Rate and Rhythm: Normal rate and regular rhythm.   Pulmonary:      Effort: Pulmonary effort is normal. No respiratory distress.      Breath sounds: No wheezing.   Abdominal:      Palpations: Abdomen is soft.      Tenderness: There is no abdominal tenderness.   Skin:     General: Skin is warm. "   Neurological:      General: No focal deficit present.      Mental Status: She is alert and oriented to person, place, and time.         Labs/Imaging/Testing:     Most recent CBC:  Lab Results   Component Value Date    WBC 8.45 08/16/2022    HGB 13.7 08/16/2022     08/16/2022    MCV 98 08/16/2022       Most recent Lipids:  Lab Results   Component Value Date    CHOL 183 08/17/2022    HDL 80 (H) 08/17/2022    LDLCALC 87.8 08/17/2022    TRIG 76 08/17/2022       Most recent Chemistry:  Lab Results   Component Value Date     08/16/2022    K 4.0 08/16/2022     08/16/2022    CO2 26 08/16/2022    BUN 11 08/16/2022    CREATININE 0.87 08/16/2022    GLU 97 08/16/2022    CALCIUM 9.8 08/16/2022    ALT 10 08/16/2022    AST 24 08/16/2022    ALKPHOS 123 08/16/2022    PROT 7.7 08/16/2022    ALBUMIN 4.8 08/16/2022       Other tests:  Lab Results   Component Value Date    TSH 2.310 08/16/2022    HGBA1C 4.8 08/17/2022    GYWBBUOH01 153 (L) 08/17/2022       Assessment and Plan:     1. Upper respiratory tract infection, unspecified type  - Noted the patient's report of a persistent, non-productive cough present throughout the day.  - Performed chest auscultation, finding no crackles or wheezes.  - Assessed the cough as not requiring prescription medication.  - Recommend OTC Robitussin or Delsym for cough suppression.  - Instructed the patient to contact the office if cough persists after completing the antibiotic course.  - Considered the possibility of a bacterial infection due to prolonged duration of respiratory symptoms.  - Prescribed azithromycin (Z-Kentrell) for 5 days to address potential secondary bacterial infection.  - Explained the difference between viral and bacterial infections and discussed how viral infections can create an environment for secondary bacterial infections.    2. Seizure disorder  - Monitored patient's seizure history with no new recent episodes.  - Continued current medication regimen of 100  mg twice daily.    - CBC Auto Differential; Future  - Comprehensive Metabolic Panel; Future  - Lipid Panel; Future  - Hemoglobin A1C; Future  - TSH; Future    3. Essential hypertension  - Arranged for an early refill of propranolol due to the patient's upcoming travel.    - propranoloL (INDERAL LA) 60 MG 24 hr capsule; Take 1 capsule (60 mg total) by mouth once daily. Please allow early fill due to upcoming travel  Dispense: 90 capsule; Refill: 3  - CBC Auto Differential; Future  - Comprehensive Metabolic Panel; Future  - Lipid Panel; Future  - Hemoglobin A1C; Future  - TSH; Future    4. Other specified spondylopathies, lumbar region    5. Insomnia, unspecified type  - Discontinued Ambien.  - Discussed alternative medication options for sleep maintenance.  - Prescribed Xanax as needed for sleep, replacing Ambien.    - ALPRAZolam (XANAX) 0.25 MG tablet; Take 1 tablet (0.25 mg total) by mouth nightly as needed for Anxiety (30).  Dispense: 30 tablet; Refill: 1    SMOKING CESSATION:  - Noted the patient's ongoing smoking habit and current tobacco use, including attempts to quit.  - Discussed smoking cessation strategies and encouraged the patient to continue efforts to quit smoking.       RTC 6 mos or sooner roddy Contreras MD  2/19/2025    This note was prepared using voice-recognition software.  Although efforts are made to proofread the note, some errors may persist in the final document.         [1]   Patient Active Problem List  Diagnosis    Seizure disorder    Chronic low back pain    Essential hypertension    Other specified spondylopathies, lumbar region   [2]   Current Outpatient Medications:     cyanocobalamin 500 mcg tablet, Take 2 tablets (1,000 mcg total) by mouth once daily., Disp: 60 tablet, Rfl: 1    diphenoxylate-atropine 2.5-0.025 mg (LOMOTIL) 2.5-0.025 mg per tablet, Take 1 tablet by mouth 4 (four) times daily as needed for Diarrhea., Disp: , Rfl:     lamoTRIgine (LAMICTAL) 25 MG tablet,  Take 2 tablets (50 mg total) by mouth once daily. (Patient taking differently: Take 100 mg by mouth 2 (two) times a day.), Disp: 30 tablet, Rfl: 1    promethazine (PHENERGAN) 25 MG tablet, Take 25 mg by mouth as needed., Disp: , Rfl:     traZODone (DESYREL) 50 MG tablet, TAKE ONE TABLET BY MOUTH EVERY EVENING, Disp: 90 tablet, Rfl: 0    ALPRAZolam (XANAX) 0.25 MG tablet, Take 1 tablet (0.25 mg total) by mouth nightly as needed for Anxiety (30)., Disp: 30 tablet, Rfl: 1    azithromycin (Z-SOPHIA) 250 MG tablet, Take 2 tablets by mouth on day 1; Take 1 tablet by mouth on days 2-5, Disp: 6 tablet, Rfl: 0    propranoloL (INDERAL LA) 60 MG 24 hr capsule, Take 1 capsule (60 mg total) by mouth once daily. Please allow early fill due to upcoming travel, Disp: 90 capsule, Rfl: 3

## 2025-02-19 ENCOUNTER — OFFICE VISIT (OUTPATIENT)
Dept: PRIMARY CARE CLINIC | Facility: CLINIC | Age: 65
End: 2025-02-19
Payer: MEDICARE

## 2025-02-19 ENCOUNTER — TELEPHONE (OUTPATIENT)
Dept: PRIMARY CARE CLINIC | Facility: CLINIC | Age: 65
End: 2025-02-19

## 2025-02-19 VITALS
DIASTOLIC BLOOD PRESSURE: 70 MMHG | SYSTOLIC BLOOD PRESSURE: 136 MMHG | WEIGHT: 129 LBS | OXYGEN SATURATION: 96 % | HEART RATE: 143 BPM | BODY MASS INDEX: 22.02 KG/M2 | HEIGHT: 64 IN

## 2025-02-19 DIAGNOSIS — M48.8X6 OTHER SPECIFIED SPONDYLOPATHIES, LUMBAR REGION: ICD-10-CM

## 2025-02-19 DIAGNOSIS — J06.9 UPPER RESPIRATORY TRACT INFECTION, UNSPECIFIED TYPE: Primary | ICD-10-CM

## 2025-02-19 DIAGNOSIS — R73.01 IFG (IMPAIRED FASTING GLUCOSE): ICD-10-CM

## 2025-02-19 DIAGNOSIS — I10 ESSENTIAL HYPERTENSION: ICD-10-CM

## 2025-02-19 DIAGNOSIS — G40.909 SEIZURE DISORDER: ICD-10-CM

## 2025-02-19 DIAGNOSIS — G47.00 INSOMNIA, UNSPECIFIED TYPE: ICD-10-CM

## 2025-02-19 DIAGNOSIS — E78.5 DYSLIPIDEMIA: ICD-10-CM

## 2025-02-19 PROBLEM — F11.20 UNCOMPLICATED OPIOID DEPENDENCE: Status: RESOLVED | Noted: 2025-02-16 | Resolved: 2025-02-19

## 2025-02-19 PROBLEM — F29 PSYCHOSIS, UNSPECIFIED PSYCHOSIS TYPE: Status: RESOLVED | Noted: 2025-02-16 | Resolved: 2025-02-19

## 2025-02-19 PROCEDURE — 99999 PR PBB SHADOW E&M-EST. PATIENT-LVL III: CPT | Mod: PBBFAC,,, | Performed by: INTERNAL MEDICINE

## 2025-02-19 RX ORDER — PROPRANOLOL HYDROCHLORIDE 60 MG/1
60 CAPSULE, EXTENDED RELEASE ORAL DAILY
Qty: 90 CAPSULE | Refills: 3 | Status: SHIPPED | OUTPATIENT
Start: 2025-02-19

## 2025-02-19 RX ORDER — ALPRAZOLAM 0.25 MG/1
0.25 TABLET ORAL NIGHTLY PRN
Qty: 30 TABLET | Refills: 1 | Status: SHIPPED | OUTPATIENT
Start: 2025-02-19 | End: 2025-03-21

## 2025-02-19 RX ORDER — AZITHROMYCIN 250 MG/1
TABLET, FILM COATED ORAL
Qty: 6 TABLET | Refills: 0 | Status: SHIPPED | OUTPATIENT
Start: 2025-02-19 | End: 2025-02-24

## 2025-04-17 ENCOUNTER — TELEPHONE (OUTPATIENT)
Dept: PRIMARY CARE CLINIC | Facility: CLINIC | Age: 65
End: 2025-04-17
Payer: MEDICARE

## 2025-04-17 DIAGNOSIS — G47.00 INSOMNIA, UNSPECIFIED TYPE: ICD-10-CM

## 2025-04-17 DIAGNOSIS — G89.29 CHRONIC LOW BACK PAIN WITH BILATERAL SCIATICA, UNSPECIFIED BACK PAIN LATERALITY: Primary | ICD-10-CM

## 2025-04-17 DIAGNOSIS — M54.41 CHRONIC LOW BACK PAIN WITH BILATERAL SCIATICA, UNSPECIFIED BACK PAIN LATERALITY: Primary | ICD-10-CM

## 2025-04-17 DIAGNOSIS — M54.42 CHRONIC LOW BACK PAIN WITH BILATERAL SCIATICA, UNSPECIFIED BACK PAIN LATERALITY: Primary | ICD-10-CM

## 2025-04-17 NOTE — TELEPHONE ENCOUNTER
----- Message from Gustavo sent at 4/17/2025 11:15 AM CDT -----  Contact: [phone]  [email or web address] Patient is calling for Medical Advice regarding: Pt said she needs a about a referral for orthopedics  How long has patient had these symptoms:Pharmacy name and phone#:Patient wants a call back or thru [facility]: call back Comments:Please advise patient replies from provider may take up to 48 hours.

## 2025-04-17 NOTE — TELEPHONE ENCOUNTER
No care due was identified.  [facility] Embedded Care Due Messages. Reference number: [id number].   4/17/2025 5:14:56 PM CDT

## 2025-04-17 NOTE — TELEPHONE ENCOUNTER
Spoke with pt. Pt stated that she would like an orthopedic referral placed for back pain. Referral pended. Please advise.

## 2025-04-20 RX ORDER — ALPRAZOLAM 0.25 MG/1
0.25 TABLET ORAL NIGHTLY PRN
Qty: 30 TABLET | Refills: 1 | Status: SHIPPED | OUTPATIENT
Start: 2025-04-20

## 2025-04-25 ENCOUNTER — TELEPHONE (OUTPATIENT)
Dept: ORTHOPEDICS | Facility: CLINIC | Age: 65
End: 2025-04-25
Payer: MEDICARE

## 2025-04-25 NOTE — TELEPHONE ENCOUNTER
Attempted to contact pt, no answer. LVM with reason for call and call back number   ----- Message from Rayo sent at 4/25/2025  9:16 AM CDT -----  Regarding: appt; for knees and hand  Contact: Patient @ [phone]  Good Morning. Patient is asking to schedule with a provider in [place] only. She is asking which  provider is able to see her for both: left knee and left hand. She declined to schedule at main campus; she has trouble navigating thru the hospital.I was unsure which provider is able to see patient for both. Please call patient to advise / assist with scheduling. Thanks.

## 2025-06-27 DIAGNOSIS — G47.00 INSOMNIA, UNSPECIFIED TYPE: ICD-10-CM

## 2025-06-27 RX ORDER — ALPRAZOLAM 0.25 MG/1
0.25 TABLET ORAL NIGHTLY PRN
Qty: 30 TABLET | Refills: 1 | Status: SHIPPED | OUTPATIENT
Start: 2025-06-27

## 2025-06-27 NOTE — TELEPHONE ENCOUNTER
No care due was identified.  [facility] Embedded Care Due Messages. Reference number: [id number].   6/27/2025 4:10:39 PM CDT

## 2025-08-06 ENCOUNTER — PATIENT OUTREACH (OUTPATIENT)
Dept: ADMINISTRATIVE | Facility: HOSPITAL | Age: 65
End: 2025-08-06
Payer: MEDICARE

## 2025-08-06 DIAGNOSIS — Z12.31 BREAST CANCER SCREENING BY MAMMOGRAM: Primary | ICD-10-CM

## 2025-08-06 NOTE — PROGRESS NOTES
Left message to schedule Mammo.  HM and immunization's reviewed and updated.  Patient is due for Mammo, Dexa Scan.

## 2025-08-07 ENCOUNTER — OFFICE VISIT (OUTPATIENT)
Dept: PRIMARY CARE CLINIC | Facility: CLINIC | Age: 65
End: 2025-08-07
Payer: MEDICARE

## 2025-08-07 ENCOUNTER — TELEPHONE (OUTPATIENT)
Dept: PRIMARY CARE CLINIC | Facility: CLINIC | Age: 65
End: 2025-08-07
Payer: MEDICARE

## 2025-08-07 VITALS
RESPIRATION RATE: 14 BRPM | SYSTOLIC BLOOD PRESSURE: 120 MMHG | BODY MASS INDEX: 20.7 KG/M2 | WEIGHT: 121.25 LBS | HEART RATE: 76 BPM | HEIGHT: 64 IN | OXYGEN SATURATION: 99 % | DIASTOLIC BLOOD PRESSURE: 64 MMHG

## 2025-08-07 DIAGNOSIS — G40.909 SEIZURE DISORDER: ICD-10-CM

## 2025-08-07 DIAGNOSIS — G47.00 INSOMNIA, UNSPECIFIED TYPE: ICD-10-CM

## 2025-08-07 DIAGNOSIS — I10 ESSENTIAL HYPERTENSION: ICD-10-CM

## 2025-08-07 DIAGNOSIS — Z72.0 TOBACCO USE: ICD-10-CM

## 2025-08-07 DIAGNOSIS — J01.00 ACUTE MAXILLARY SINUSITIS, RECURRENCE NOT SPECIFIED: Primary | ICD-10-CM

## 2025-08-07 DIAGNOSIS — F41.9 ANXIETY: ICD-10-CM

## 2025-08-07 DIAGNOSIS — F33.2 MAJOR DEPRESSIVE DISORDER, RECURRENT SEVERE WITHOUT PSYCHOTIC FEATURES: ICD-10-CM

## 2025-08-07 PROCEDURE — 1101F PT FALLS ASSESS-DOCD LE1/YR: CPT | Mod: CPTII,S$GLB,, | Performed by: INTERNAL MEDICINE

## 2025-08-07 PROCEDURE — 3074F SYST BP LT 130 MM HG: CPT | Mod: CPTII,S$GLB,, | Performed by: INTERNAL MEDICINE

## 2025-08-07 PROCEDURE — 3288F FALL RISK ASSESSMENT DOCD: CPT | Mod: CPTII,S$GLB,, | Performed by: INTERNAL MEDICINE

## 2025-08-07 PROCEDURE — 1159F MED LIST DOCD IN RCRD: CPT | Mod: CPTII,S$GLB,, | Performed by: INTERNAL MEDICINE

## 2025-08-07 PROCEDURE — 1126F AMNT PAIN NOTED NONE PRSNT: CPT | Mod: CPTII,S$GLB,, | Performed by: INTERNAL MEDICINE

## 2025-08-07 PROCEDURE — 3008F BODY MASS INDEX DOCD: CPT | Mod: CPTII,S$GLB,, | Performed by: INTERNAL MEDICINE

## 2025-08-07 PROCEDURE — 99999 PR PBB SHADOW E&M-EST. PATIENT-LVL III: CPT | Mod: PBBFAC,,, | Performed by: INTERNAL MEDICINE

## 2025-08-07 PROCEDURE — 99214 OFFICE O/P EST MOD 30 MIN: CPT | Mod: S$GLB,,, | Performed by: INTERNAL MEDICINE

## 2025-08-07 PROCEDURE — 3078F DIAST BP <80 MM HG: CPT | Mod: CPTII,S$GLB,, | Performed by: INTERNAL MEDICINE

## 2025-08-07 RX ORDER — LAMOTRIGINE 25 MG/1
50 TABLET ORAL DAILY
Qty: 180 TABLET | Refills: 3 | Status: SHIPPED | OUTPATIENT
Start: 2025-08-07 | End: 2026-08-07

## 2025-08-07 RX ORDER — TRAZODONE HYDROCHLORIDE 50 MG/1
50 TABLET ORAL NIGHTLY
Qty: 90 TABLET | Refills: 1 | Status: SHIPPED | OUTPATIENT
Start: 2025-08-07

## 2025-08-07 RX ORDER — AMOXICILLIN AND CLAVULANATE POTASSIUM 875; 125 MG/1; MG/1
1 TABLET, FILM COATED ORAL 2 TIMES DAILY
Qty: 20 TABLET | Refills: 0 | Status: SHIPPED | OUTPATIENT
Start: 2025-08-07

## 2025-08-07 RX ORDER — LAMOTRIGINE 25 MG/1
50 TABLET ORAL DAILY
Qty: 60 TABLET | Refills: 3 | Status: SHIPPED | OUTPATIENT
Start: 2025-08-07 | End: 2025-08-07

## 2025-08-07 NOTE — PROGRESS NOTES
[facility] Progress Note  8/7/2025          Reason for Visit:      had concerns including Nasal Congestion (Coughing up phlegm, nose clogged,).       History of Present Illness:     Patient presents today for sinus problems.    Right sinus pressure/congestion  She reports ongoing right-sided sinus symptoms with constant nasal congestion throughout the day requiring frequent nose blowing. Her right sinus feels tight, compacted, and will not drain properly. She experiences clear phlegm when coughing, though not in large quantities. She felt slightly warm this morning but denies chills or fever. She has not previously experienced a diagnosed sinus infection. She is currently using Mucinex 1200 mg with minimal effectiveness.    Insomnia  She reports ongoing sleep disruption with persistent early morning awakenings, consistently waking between 3:00 and 4:00 AM. Trazodone provides some assistance with initial sleep onset but does not effectively maintain sleep throughout the night.    She is currently attempting to quit smoking, noting this is the first time she has been seriously committed to cessation. She previously successfully quit smoking for almost two years approximately 10 years ago. She is currently attempting to quit with a supportive partner.    Seizure disorder  Lamotrigine 25 bid  She takes Lamotrigine 25 mg twice daily for seizures  Follows with neurology   (Previously was on dilantin - gum hypertrophy, then depakote- off those now)     She denies environmental or medication allergies. Anti-inflammatory medications cause significant GI distress with upset stomach.     HTN  Propranolol LA 60 mg  Pt reports she was started on this for Hypertension.    She says it has been working well and denies any side effects         Problem List:     Problem List[1]      Medications:     Current Medications[2]      Review of Systems:     Review of Systems   Constitutional:  Negative for chills and  "fever.   HENT:  Negative for ear pain and sore throat.    Respiratory:  Negative for cough, shortness of breath and wheezing.    Cardiovascular:  Negative for chest pain and palpitations.   Gastrointestinal:  Negative for constipation, diarrhea, nausea and vomiting.   Genitourinary:  Negative for dysuria and hematuria.   Musculoskeletal:  Negative for joint swelling and joint deformity.   Neurological:  Negative for dizziness and weakness.         Physical Exam:     Temp:             Blood Pressure:  120/64        Pulse:   76     Respirations:  14  Weight:   55 kg (121 lb 4.1 oz)  Height:   5' 4" (1.626 m)  BMI:     Body mass index is 20.81 kg/m².    Physical Exam  Constitutional:       General: She is not in acute distress.  HENT:      Right Ear: Tympanic membrane normal.      Left Ear: Tympanic membrane normal.      Nose: No congestion or rhinorrhea.      Mouth/Throat:      Pharynx: No oropharyngeal exudate or posterior oropharyngeal erythema.   Cardiovascular:      Rate and Rhythm: Normal rate and regular rhythm.   Pulmonary:      Effort: Pulmonary effort is normal. No respiratory distress.      Breath sounds: No wheezing.   Abdominal:      Palpations: Abdomen is soft.      Tenderness: There is no abdominal tenderness.   Skin:     General: Skin is warm.   Neurological:      General: No focal deficit present.      Mental Status: She is alert and oriented to person, place, and time.           Labs/Imaging/Testing:     Most recent CBC:  Lab Results   Component Value Date    WBC 8.45 08/16/2022    HGB 13.7 08/16/2022     08/16/2022    MCV 98 08/16/2022       Most recent Lipids:  Lab Results   Component Value Date    CHOL 183 08/17/2022    HDL 80 (H) 08/17/2022    LDLCALC 87.8 08/17/2022    TRIG 76 08/17/2022       Most recent Chemistry:  Lab Results   Component Value Date     08/16/2022    K 4.0 08/16/2022     08/16/2022    CO2 26 08/16/2022    BUN 11 08/16/2022    CREATININE 0.87 08/16/2022    GLU " 97 08/16/2022    CALCIUM 9.8 08/16/2022    ALT 10 08/16/2022    AST 24 08/16/2022    ALKPHOS 123 08/16/2022    PROT 7.7 08/16/2022    ALBUMIN 4.8 08/16/2022       Other tests:  Lab Results   Component Value Date    TSH 2.310 08/16/2022    HGBA1C 4.8 08/17/2022    [id number] 153 (L) 08/17/2022     Assessment and Plan:     Visit Summary:  Suspect bacterial sinusitis based on symptoms of persistent nasal congestion, sinus pressure, and clear phlegm production.  Considered age (over 65) when advising against use of decongestants due to potential side effects.    - Continue current Xanax regimen for anxiety management  - Prescribed Augmentin 875/125 mg twice daily for 10 days for bacterial sinusitis  - Continue Lamotrigine 25 mg twice daily for seizure control  - Continue Trazodone for sleep management  - Recommend trial of OTC antihistamines (Allegra, Claritin, or Zyrtec) if desired  - Suggested OTC nasal saline spray and proper saline rinses  - Contact office if symptoms persist after completing antibiotic course       1. Acute maxillary sinusitis, recurrence not specified  - Patient presents with right-sided sinus problems, feeling compacted, and constantly blowing nose without fever or chills.  - Examination revealed need to clear throat and nose.  - Diagnosed with potential bacterial sinusitis based on clinical presentation.  - Prescribed Augmentin 875/125 mg twice daily for 10 days.  - Discussed differences between mucolytics (Mucinex) and decongestants (Sudafed).  - Recommend OTC nasal saline spray and proper saline rinses using distilled or boiled water to avoid amoebic infection risk.  - Suggested trial of daily OTC antihistamines (Allegra, Claritin, or Zyrtec) if desired.  - Patient instructed to contact office if symptoms persist after completing antibiotic course.    2. Insomnia, unspecified type  - Patient reports difficulty staying asleep, waking at 3:00-4:00 AM.  - Current Trazodone regimen helps with  falling asleep but not staying asleep.  - Discussed alternative approaches such as sleep meditations.  - Will continue Trazodone for sleep management.    3. Seizure disorder    - Patient's seizures have been well-controlled for 35 years.  - Current regimen includes Lamotrigine 25 mg twice daily (morning and evening).  - Will continue current medication regimen.    4. Essential hypertension  Stable on current meds    5. Major depressive disorder, recurrent severe without psychotic features  Stable at current time    6. Tobacco use  - Patient reports first serious quit attempt, having abstained from smoking for 5-6 days.  - Discussed benefits of quitting with a partner for mutual support.  - Acknowledged cessation efforts and encouraged continued abstinence.    7. Anxiety  - Patient uses Xanax 0.5-1 tablet as needed for anxiety attacks and has been doing so for an extended period with good effectiveness.  - Will continue current Xanax regimen for anxiety management.    RTC 6 mos or sooner roddy Contreras MD  8/7/2025    This note was prepared using voice-recognition software.  Although efforts are made to proofread the note, some errors may persist in the final document.         [1]   Patient Active Problem List  Diagnosis    Seizure disorder    Chronic low back pain    Essential hypertension    Other specified spondylopathies, lumbar region    Major depressive disorder, recurrent severe without psychotic features   [2]   Current Outpatient Medications:     diphenoxylate-atropine 2.5-0.025 mg (LOMOTIL) 2.5-0.025 mg per tablet, Take 1 tablet by mouth 4 (four) times daily as needed for Diarrhea., Disp: , Rfl:     promethazine (PHENERGAN) 25 MG tablet, Take 25 mg by mouth as needed., Disp: , Rfl:     propranoloL (INDERAL LA) 60 MG 24 hr capsule, Take 1 capsule (60 mg total) by mouth once daily. Please allow early fill due to upcoming travel, Disp: 90 capsule, Rfl: 3    ALPRAZolam (XANAX) 0.25 MG tablet, TAKE  ONE TABLET BY MOUTH EVERY NIGHT AS NEEDED FOR ANXIETY, Disp: 30 tablet, Rfl: 1    amoxicillin-clavulanate 875-125mg (AUGMENTIN) 875-125 mg per tablet, Take 1 tablet by mouth 2 (two) times daily., Disp: 20 tablet, Rfl: 0    cyanocobalamin 500 mcg tablet, Take 2 tablets (1,000 mcg total) by mouth once daily. (Patient not taking: Reported on 8/7/2025), Disp: 60 tablet, Rfl: 1    lamoTRIgine (LAMICTAL) 25 MG tablet, Take 2 tablets (50 mg total) by mouth once daily., Disp: 180 tablet, Rfl: 3    traZODone (DESYREL) 50 MG tablet, Take 1 tablet (50 mg total) by mouth every evening., Disp: 90 tablet, Rfl: 1

## 2025-08-07 NOTE — TELEPHONE ENCOUNTER
Copied from CRM #[id number]. Topic: General Inquiry - Patient Advice  >> Aug 7, 2025  8:46 AM Rosie wrote:  .1MEDICALADVICE     Patient is calling for Medical Advice regarding: pt states that she has a bad sinus infection and would like to have antibiotics called in to the pharmacy. States otc meds are not working     How long has patient had these symptoms: approx 5 to 6 days     States she will do her Mammo sometime in the future    Pharmacy name and phone#:     [facility] - [place] - [address]  [address]  [place] [address]  Phone: [phone] Fax: [phone]        Patient wants a call back or thru [facility], provide patient's call back phone number:    Comments:    Please advise patient replies from provider may take up to 48 hours.

## 2025-08-11 ENCOUNTER — TELEPHONE (OUTPATIENT)
Dept: PRIMARY CARE CLINIC | Facility: CLINIC | Age: 65
End: 2025-08-11
Payer: MEDICARE

## 2025-08-11 DIAGNOSIS — R05.9 COUGH, UNSPECIFIED TYPE: Primary | ICD-10-CM

## 2025-08-11 RX ORDER — CODEINE PHOSPHATE AND GUAIFENESIN 10; 100 MG/5ML; MG/5ML
5 SOLUTION ORAL 3 TIMES DAILY PRN
Qty: 120 ML | Refills: 0 | Status: SHIPPED | OUTPATIENT
Start: 2025-08-11 | End: 2025-08-21

## 2025-08-21 ENCOUNTER — TELEPHONE (OUTPATIENT)
Dept: PRIMARY CARE CLINIC | Facility: CLINIC | Age: 65
End: 2025-08-21
Payer: MEDICARE

## 2025-08-25 DIAGNOSIS — G47.00 INSOMNIA, UNSPECIFIED TYPE: ICD-10-CM

## 2025-08-25 RX ORDER — ALPRAZOLAM 0.25 MG/1
0.25 TABLET ORAL NIGHTLY PRN
Qty: 30 TABLET | Refills: 1 | Status: SHIPPED | OUTPATIENT
Start: 2025-08-25